# Patient Record
(demographics unavailable — no encounter records)

---

## 2024-11-15 NOTE — DISCUSSION/SUMMARY
[FreeTextEntry1] : 50 year old woman with history of gastric sleeve, ZANA on CPAP (although hasn't used since her surgery) who comes in for evaluation. CCTA and TTE normal BP controlled FU as needed  [EKG obtained to assist in diagnosis and management of assessed problem(s)] : EKG obtained to assist in diagnosis and management of assessed problem(s)

## 2024-11-15 NOTE — HISTORY OF PRESENT ILLNESS
[FreeTextEntry1] : 50 year old woman with history of gastric sleeve, anemia (on iron infusions) who comes in for evaluation.   She states she went to ED in  for some edema and shortness of breath-  Edema and dyspnea resolved Still with left sided chest pain She has started Ozempic  8/2024: CCTA: Coronary Calcium Score = 0 Agatston Units. LMCA-no luminal narrowing. LAD-no luminal narrowing of the proximal and mid LAD. The distal LAD is diminutive. Diagonal Branches-no luminal narrowing of the first interphalangeal branch. The second and third diagonal branches are small and patent. LCx-no luminal narrowing of the proximal, mid, and distal LCx. Obtuse Marginal Branches-no luminal narrowing. RCA-no luminal narrowing of the proximal RCA. The mid to distal RCA is diminutive. PDA-no luminal narrowing. There is a left dominant coronary arterial system.  8/2024: 1. Left ventricular systolic function is normal with an ejection fraction of 75 % by Bryan's method of disks with an ejection fraction visually estimated at 65 to 70 %. There are no regional wall motion abnormalities seen. 2. Normal left ventricular diastolic function. 3. Hypermobile interatrial septum 4. Trileaflet aortic valve with normal systolic excursion. 5. The peak transaortic velocity is 1.83 m/s, peak transaortic gradient is 13.4 mmHg and mean transaortic gradient is 6.0 mmHg with an LVOT/aortic valve VTI ratio of 0.73. 6. No evidence of aortic regurgitation. 7. Structurally normal mitral valve with normal leaflet excursion. 8. No mitral regurgitation. 9. No echocardiographic evidence of pulmonary hypertension.